# Patient Record
Sex: MALE | Race: WHITE
[De-identification: names, ages, dates, MRNs, and addresses within clinical notes are randomized per-mention and may not be internally consistent; named-entity substitution may affect disease eponyms.]

---

## 2018-08-18 ENCOUNTER — HOSPITAL ENCOUNTER (EMERGENCY)
Dept: HOSPITAL 75 - ER | Age: 68
Discharge: HOME | End: 2018-08-18
Payer: MEDICARE

## 2018-08-18 VITALS — HEIGHT: 69 IN | WEIGHT: 260 LBS | BODY MASS INDEX: 38.51 KG/M2

## 2018-08-18 VITALS — SYSTOLIC BLOOD PRESSURE: 123 MMHG | DIASTOLIC BLOOD PRESSURE: 74 MMHG

## 2018-08-18 DIAGNOSIS — Z87.442: ICD-10-CM

## 2018-08-18 DIAGNOSIS — Z86.73: ICD-10-CM

## 2018-08-18 DIAGNOSIS — Z87.891: ICD-10-CM

## 2018-08-18 DIAGNOSIS — M47.816: Primary | ICD-10-CM

## 2018-08-18 LAB
APTT PPP: YELLOW S
BACTERIA #/AREA URNS HPF: (no result) /HPF
BILIRUB UR QL STRIP: NEGATIVE
FIBRINOGEN PPP-MCNC: CLEAR MG/DL
GLUCOSE UR STRIP-MCNC: NEGATIVE MG/DL
KETONES UR QL STRIP: NEGATIVE
LEUKOCYTE ESTERASE UR QL STRIP: NEGATIVE
NITRITE UR QL STRIP: NEGATIVE
PH UR STRIP: 5 [PH] (ref 5–9)
PROT UR QL STRIP: NEGATIVE
RBC #/AREA URNS HPF: (no result) /HPF
SP GR UR STRIP: 1.02 (ref 1.02–1.02)
UROBILINOGEN UR-MCNC: NORMAL MG/DL
WBC #/AREA URNS HPF: (no result) /HPF

## 2018-08-18 PROCEDURE — 72100 X-RAY EXAM L-S SPINE 2/3 VWS: CPT

## 2018-08-18 PROCEDURE — 81000 URINALYSIS NONAUTO W/SCOPE: CPT

## 2018-08-18 NOTE — DIAGNOSTIC IMAGING REPORT
INDICATION: Back pain, spasms, more on the right side.



TECHNIQUE: AP, lateral and spot imaging of the lumbar spine.



CORRELATION STUDY: None.



FINDINGS: Lumbar spinal alignment is relatively anatomic. Lumbar

vertebral body heights are maintained. Very mild anterior wedging

at the L1-T12 levels, nonacute. Mild multilevel disc space

narrowing but most severe at L5-S1 level. Rather prominent

anterior osteophytes are noted with some areas of bridging

osteophytes present. Hypertrophic facet arthropathy at the L5-S1

levels. Findings suspect for some degree of osseous narrowing of

the foramina and spinal canal at this level. SI joints are

unremarkable. Mild degenerative changes of bilateral hip joints.



IMPRESSION: No radiographic evidence for acute bony abnormality

of the lumbar spine. Chronic appearing changes about the lumbar

spine, including bridging osteophytes and hypertrophic facet

arthropathy. Component of foraminal and/or spinal canal

narrowing, particularly at the L5-S1 level owing to degenerative

changes not excluded.



Dictated by: 



  Dictated on workstation # [id number]

## 2018-08-18 NOTE — ED BACK PAIN
General


Chief Complaint:  Back Problems


Stated Complaint:  BACK SPASMS/PAIN,"KIDNEY AREA"





History of Present Illness


Date Seen by Provider:  Aug 18, 2018


Time Seen by Provider:  20:00


Initial Comments


67-year-old  male presents for right-sided low back and flank pain. He 

reports a history of kidney stones, many years ago but it did require surgical 

removal. He had a stroke in the past with left-sided weakness. He is unsure of 

his medication and does not have a list with him. [facility] He's been taking ibuprofen for the pain as needed.  He reports the pain 

to be noted when he does a twisting in his low back. He denies any radicular 

symptoms.


Timing/Duration:  Intermittent


Pain/Injury Location:  Back


Method of Injury:  Unknown


Associated Symptoms:  muscle spasms (chronic left-sided secondary to CVA.); No 

numbness in legs/feet, No tingling in legs/feet, No sensory/motor loss; lower 

back pain; No loss of bladder control, No loss of bowel control





Allergies and Home Medications


Allergies


Coded Allergies:  


     No Known Drug Allergies (Unverified , 18)





Home Medications


Tramadol HCl 50 Mg Tablet, 50 MG PO Q8H


   Prescribed by: ACE PALUMBO on 18





Patient Home Medication List


Home Medication List Reviewed:  Yes (patient unsure of his medications)





Constitutional:  no symptoms reported, see HPI


Musculoskeletal:  see HPI, back pain





Past Medical-Social-Family Hx


Patient Social History


Alcohol Use:  Denies Use


Recreational Drug Use:  No


Smoking Status:  Former Smoker


Former Smoker, Quit:  Sep 1, 2016


Recent Foreign Travel:  No


Contact w/Someone Who Travel:  No


Recent Hopitalizations:  No





Immunizations Up To Date


Tetanus Booster (TDap):  Unknown





Seasonal Allergies


Seasonal Allergies:  No





Past Medical History


Surgeries:  Yes (KIDNEY STONE REMOVAL, LEFT KNEE )


Respiratory:  No


Neurological:  Yes (STROKE WITH LEFT SIDE WEAKNESS (2016))


Stroke


Genitourinary:  Yes


Kidney Stones


HEENT:  No


Cancer:  No


Psychosocial:  No


Integumentary:  No


Blood Disorders:  No





Physical Exam


Vital Signs





Vital Signs - First Documented








 18





 19:50


 


Temp 98.2


 


Pulse 74


 


Resp 19


 


B/P (MAP) 123/74 (90)


 


Pulse Ox 94


 


O2 Delivery Room Air





Capillary Refill :


Height, Weight, BMI


Height: '"


Weight: lbs. oz. kg;  BMI


Method:


General Appearance:  No Apparent Distress, WD/WN


Neck:  Full Range of Motion, Normal Inspection, Non Tender


Cardiovascular:  Regular Rate, Rhythm, No Edema, No Murmur, Normal Peripheral 

Pulses


Respiratory:  Chest Non Tender, Lungs Clear, Normal Breath Sounds


Gastrointestinal:  Normal Bowel Sounds, No Pulsatile Mass, Non Tender, Soft


Back:  Normal Inspection, No CVA Tenderness (secondary to pain), Decreased 

Range of Motion, Muscle Spasm, Vertebral Tenderness (lower lumbar)


Extremity:  Normal Capillary Refill, Normal Inspection, No Pedal Edema


Neurologic/Psychiatric:  Alert, Oriented x3, No Motor/Sensory Deficits, Normal 

Mood/Affect


Skin:  Normal Color, Warm/Dry





Progress/Results/Core Measures


Results/Orders


Lab Results





Laboratory Tests








Test


 18


19:50 Range/Units


 


 


Urine Color YELLOW   


 


Urine Clarity CLEAR   


 


Urine pH 5  5-9  


 


Urine Specific Gravity 1.025 H 1.016-1.022  


 


Urine Protein NEGATIVE  NEGATIVE  


 


Urine Glucose (UA) NEGATIVE  NEGATIVE  


 


Urine Ketones NEGATIVE  NEGATIVE  


 


Urine Nitrite NEGATIVE  NEGATIVE  


 


Urine Bilirubin NEGATIVE  NEGATIVE  


 


Urine Urobilinogen NORMAL  NORMAL  MG/DL


 


Urine Leukocyte Esterase NEGATIVE  NEGATIVE  


 


Urine RBC (Auto) NEGATIVE  NEGATIVE  


 


Urine RBC NONE   /HPF


 


Urine WBC 0-2   /HPF


 


Urine Squamous Epithelial


Cells 10-25 H


  /HPF





 


Urine Crystals NONE   /LPF


 


Urine Bacteria TRACE   /HPF


 


Urine Casts NONE   /LPF


 


Urine Mucus MODERATE H  /LPF


 


Urine Culture Indicated NO   








My Orders





Orders - ACE PALUMBO


Ua Culture If Indicated (18 19:40)


Lumbar Spine - 2-3 Views (18 20:06)


Tramadol Tablet (Ultram Tablet) (18 20:49)





Vital Signs/I&O











 18





 19:50 21:03


 


Temp 98.2 98.2


 


Pulse 74 74


 


Resp 19 19


 


B/P (MAP) 123/74 (90) 123/74 (90)


 


Pulse Ox 94 94


 


O2 Delivery Room Air 











Progress


Progress Note :  


   Time:  20:00


Progress Note


Initial evaluation completed, recommended UA to assess for kidney stone.


 no RBCs and UA. We'll obtain x-rays of the lumbar spine.


 significant degenerative changes in the lumbar spine and bilateral hips on 

x-ray. No acute processes noted.


 discharge instructions and return precautions reviewed with the patient. 

All questions answered.





Diagnostic Imaging





   Diagonstic Imaging:  Xray


   Plain Films/CT/US/NM/MRI:  other (L Spine)


Comments


NAME:   JOSUE LEIVA


MED REC#:   [id number]


ACCOUNT#:   [id number]


PT STATUS:   REG ER


:   1950


PHYSICIAN:   ACE PALUMBO


ADMIT DATE:   18/ER


 ***Draft***


Date of Exam:18





LUMBAR SPINE - 2-3 VIEWS








INDICATION: Back pain, spasms, more on the right side.





TECHNIQUE: AP, lateral and spot imaging of the lumbar spine.





CORRELATION STUDY: None.





FINDINGS: Lumbar spinal alignment is relatively anatomic. Lumbar


vertebral body heights are maintained. Very mild anterior wedging


at the L1-T12 levels, nonacute. Mild multilevel disc space


narrowing but most severe at L5-S1 level. Rather prominent


anterior osteophytes are noted with some areas of bridging


osteophytes present. Hypertrophic facet arthropathy at the L5-S1


levels. Findings suspect for some degree of osseous narrowing of


the foramina and spinal canal at this level. SI joints are


unremarkable. Mild degenerative changes of bilateral hip joints.





IMPRESSION: No radiographic evidence for acute bony abnormality


of the lumbar spine. Chronic appearing changes about the lumbar


spine, including bridging osteophytes and hypertrophic facet


arthropathy. Component of foraminal and/or spinal canal


narrowing, particularly at the L5-S1 level owing to degenerative


changes not excluded.





  Dictated on workstation # [id number]








Dict:   18


Trans:   18


[facility] [id number]





Interpreted by:     ADRIANNA EVANS DO


Electronically signed by:


   Reviewed:  Reviewed by Me





Departure


Impression





 Primary Impression:  


 Degenerative joint disease (DJD) of lumbar spine


 Qualified Codes:  M47.816 - Spondylosis without myelopathy or radiculopathy, 

lumbar region


Disposition:  01 HOME, SELF-CARE


Condition:  Improved





Departure-Patient Inst.


Decision time for Depature:  20:50


Patient Instructions:  Low Back Pain  (DC)





Add. Discharge Instructions:  


Ice to low back 20 minutes every 2 hours all awake.


Increase water intake.


Take the tramadol 1 tablet every 8 hours for mild-to-moderate pain.


Continue to take ibuprofen 600 mg every 8 hours.


[facility] if symptoms are not 

improving or worsen.


Return to the emergency department for new acute health care problems.





All discharge instructions reviewed with patient and/or family. Voiced 

understanding.


Scripts


Tramadol HCl (Tramadol HCl) 50 Mg Tablet


50 MG PO Q8H, #30 TAB 0 Refills


   Prov: ACE PALUMBO         18











ACE PALUMBO Aug 18, 2018 20:42

## 2019-09-18 ENCOUNTER — HOSPITAL ENCOUNTER (OUTPATIENT)
Dept: HOSPITAL 75 - RT | Age: 69
End: 2019-09-18
Attending: NURSE PRACTITIONER
Payer: MEDICARE

## 2019-09-18 DIAGNOSIS — Z87.891: ICD-10-CM

## 2019-09-18 DIAGNOSIS — I63.9: Primary | ICD-10-CM

## 2019-09-18 DIAGNOSIS — R06.89: ICD-10-CM

## 2019-09-18 PROCEDURE — 94726 PLETHYSMOGRAPHY LUNG VOLUMES: CPT

## 2019-09-18 PROCEDURE — 94729 DIFFUSING CAPACITY: CPT

## 2019-09-18 PROCEDURE — 94060 EVALUATION OF WHEEZING: CPT

## 2019-09-21 ENCOUNTER — HOSPITAL ENCOUNTER (OUTPATIENT)
Dept: HOSPITAL 75 - SLEEP | Age: 69
LOS: 1 days | Discharge: HOME | End: 2019-09-22
Attending: NURSE PRACTITIONER
Payer: OTHER GOVERNMENT

## 2019-09-21 DIAGNOSIS — I63.9: ICD-10-CM

## 2019-09-21 DIAGNOSIS — G47.33: Primary | ICD-10-CM

## 2019-09-21 PROCEDURE — 95811 POLYSOM 6/>YRS CPAP 4/> PARM: CPT

## 2022-10-03 ENCOUNTER — HOSPITAL ENCOUNTER (OUTPATIENT)
Dept: HOSPITAL 75 - CARD | Age: 72
End: 2022-10-03
Attending: UROLOGY
Payer: COMMERCIAL

## 2022-10-03 DIAGNOSIS — C61: Primary | ICD-10-CM

## 2022-10-03 DIAGNOSIS — K57.30: ICD-10-CM

## 2022-10-03 DIAGNOSIS — N28.1: ICD-10-CM

## 2022-10-03 PROCEDURE — 78306 BONE IMAGING WHOLE BODY: CPT

## 2022-10-03 PROCEDURE — 74176 CT ABD & PELVIS W/O CONTRAST: CPT

## 2022-10-03 NOTE — DIAGNOSTIC IMAGING REPORT
INDICATION: 

Prostate cancer. 



TECHNIQUE:

The patient received a 26.2 mCi intravenous dose of technetium

99M MDP. After 3 hours, whole-body planar imaging was performed.



COMPARISON:  

No priors for direct comparison; however, the exam is correlated

with a CT performed this same date of the abdomen and pelvis.



FINDINGS:

There is an arthritic pattern of uptake involving the right

greater than left knees as well as right greater than left

shoulders. A mild degenerative pattern of uptake in the thoracic

and mid to lower lumbar spine is present. These areas correlate

with bony hypertrophy present at CT. No suspicious

radiopharmaceutical accumulation. The calvarium, ribs, sternum,

and manubrium are unremarkable. The shafts of the long bones are

unremarkable. The bony pelvis is unremarkable. 



IMPRESSION: 

Degenerative distribution of the radiopharmacy is present but no

suspicious scintigraphic finding.



Dictated by: 



  Dictated on workstation # [id number]

## 2022-10-03 NOTE — DIAGNOSTIC IMAGING REPORT
PROCEDURE: CT abdomen and pelvis without contrast.



TECHNIQUE: Multiple contiguous axial images were obtained through

the abdomen and pelvis without the use of intravenous contrast.

Auto Exposure Controls were utilized during the CT exam to meet

ALARA standards for radiation dose reduction. 



INDICATION:  Recently diagnosed prostate carcinoma.



No prior studies are available for comparison.



FINDINGS: Lung bases are clear. The liver and gallbladder are

unremarkable. There is no biliary ductal dilatation. Pancreas and

spleen are unremarkable. No adrenal mass is detected. The right

kidney does contain nonobstructing calculi, largest approximately

5 mm in size. There is a large cyst lower pole right kidney

measuring 9.2 x 9.7 cm. Low-attenuation lesion left kidney

measures 3.2 cm and is consistent with a cyst. Aorta is mildly

calcified but is nonaneurysmal. There is diverticulosis of the

descending and sigmoid colon but no evidence of acute

diverticulitis. Bladder is unremarkable. Prostate is enlarged. No

definite abdominal or pelvic lymphadenopathy is seen. There is no

free fluid identified. No osteoblastic lesions are detected.



IMPRESSION:

1. Renal cysts.

2. Uncomplicated diverticulosis.

3. Prostatomegaly.

4. No evidence of abdominal or pelvic lymphadenopathy or

metastatic disease.



Dictated by: 



  Dictated on workstation # [id number]

## 2022-11-16 ENCOUNTER — HOSPITAL ENCOUNTER (OUTPATIENT)
Dept: HOSPITAL 75 - ONC | Age: 72
LOS: 14 days | Discharge: HOME | End: 2022-11-30
Attending: INTERNAL MEDICINE
Payer: COMMERCIAL

## 2022-11-16 DIAGNOSIS — I10: ICD-10-CM

## 2022-11-16 DIAGNOSIS — E66.9: ICD-10-CM

## 2022-11-16 DIAGNOSIS — C61: Primary | ICD-10-CM

## 2022-11-16 DIAGNOSIS — E78.5: ICD-10-CM

## 2022-11-16 PROCEDURE — 96402 CHEMO HORMON ANTINEOPL SQ/IM: CPT

## 2023-02-22 ENCOUNTER — HOSPITAL ENCOUNTER (OUTPATIENT)
Dept: HOSPITAL 75 - PREOP | Age: 73
LOS: 5 days | Discharge: HOME | End: 2023-02-27
Attending: RADIOLOGY
Payer: COMMERCIAL

## 2023-02-22 VITALS — BODY MASS INDEX: 37.09 KG/M2 | HEIGHT: 69.02 IN | WEIGHT: 250.45 LBS

## 2023-02-22 DIAGNOSIS — Z01.818: Primary | ICD-10-CM

## 2023-02-28 ENCOUNTER — HOSPITAL ENCOUNTER (OUTPATIENT)
Dept: HOSPITAL 75 - ONC | Age: 73
Discharge: HOME | End: 2023-02-28
Attending: INTERNAL MEDICINE
Payer: COMMERCIAL

## 2023-02-28 DIAGNOSIS — E66.9: ICD-10-CM

## 2023-02-28 DIAGNOSIS — C61: Primary | ICD-10-CM

## 2023-02-28 DIAGNOSIS — E78.5: ICD-10-CM

## 2023-02-28 DIAGNOSIS — I10: ICD-10-CM

## 2023-02-28 PROCEDURE — 99205 OFFICE O/P NEW HI 60 MIN: CPT

## 2023-03-01 ENCOUNTER — HOSPITAL ENCOUNTER (OUTPATIENT)
Dept: HOSPITAL 75 - SDC | Age: 73
Discharge: HOME | End: 2023-03-01
Attending: RADIOLOGY
Payer: COMMERCIAL

## 2023-03-01 VITALS — SYSTOLIC BLOOD PRESSURE: 156 MMHG | DIASTOLIC BLOOD PRESSURE: 90 MMHG

## 2023-03-01 VITALS — DIASTOLIC BLOOD PRESSURE: 94 MMHG | SYSTOLIC BLOOD PRESSURE: 174 MMHG

## 2023-03-01 VITALS — DIASTOLIC BLOOD PRESSURE: 69 MMHG | SYSTOLIC BLOOD PRESSURE: 112 MMHG

## 2023-03-01 VITALS — BODY MASS INDEX: 37.09 KG/M2 | HEIGHT: 68.9 IN | WEIGHT: 250.45 LBS

## 2023-03-01 VITALS — DIASTOLIC BLOOD PRESSURE: 86 MMHG | SYSTOLIC BLOOD PRESSURE: 163 MMHG

## 2023-03-01 VITALS — DIASTOLIC BLOOD PRESSURE: 87 MMHG | SYSTOLIC BLOOD PRESSURE: 151 MMHG

## 2023-03-01 VITALS — SYSTOLIC BLOOD PRESSURE: 146 MMHG | DIASTOLIC BLOOD PRESSURE: 83 MMHG

## 2023-03-01 VITALS — SYSTOLIC BLOOD PRESSURE: 148 MMHG | DIASTOLIC BLOOD PRESSURE: 92 MMHG

## 2023-03-01 VITALS — DIASTOLIC BLOOD PRESSURE: 84 MMHG | SYSTOLIC BLOOD PRESSURE: 144 MMHG

## 2023-03-01 VITALS — SYSTOLIC BLOOD PRESSURE: 120 MMHG | DIASTOLIC BLOOD PRESSURE: 75 MMHG

## 2023-03-01 VITALS — SYSTOLIC BLOOD PRESSURE: 158 MMHG | DIASTOLIC BLOOD PRESSURE: 86 MMHG

## 2023-03-01 DIAGNOSIS — R97.20: ICD-10-CM

## 2023-03-01 DIAGNOSIS — Z99.81: ICD-10-CM

## 2023-03-01 DIAGNOSIS — C61: Primary | ICD-10-CM

## 2023-03-01 DIAGNOSIS — G47.33: ICD-10-CM

## 2023-03-01 PROCEDURE — 87081 CULTURE SCREEN ONLY: CPT

## 2023-03-01 NOTE — ANESTHESIA-GENERAL POST-OP
General


Patient Condition


Mental Status/LOC:  Same as Preop


Cardiovascular:  Satisfactory


Nausea/Vomiting:  Absent


Respiratory:  Satisfactory


Pain:  Controlled


Complications:  Absent





Post Op Complications


Complications


None





Follow Up Care/Instructions


Patient Instructions


None needed.





Anesthesia/Patient Condition


Patient Condition


Patient is doing well, no complaints, stable vital signs, no apparent adverse 

anesthesia problems.   


No complications reported per nursing.











RICHELLE SERNA CRNA           Mar 1, 2023 10:56

## 2023-03-01 NOTE — PROGRESS NOTE-POST OPERATIVE
Post-Operative Progess Note


Surgeon (s)/Assistant (s)


Surgeon


DUANE MYERS MD


Assistant:  N/A





Pre-Operative Diagnosis


Prostate cancer cT4, PSA 12, Lingle 9-10 / GG 5





Post-Operative Diagnosis





Same as pre-op





Procedure & Operative Findings


Date of Procedure


3/1/23


Procedure Performed/Findings


(1) Placement of fiducial gold seed markers (2) Injection of biodegradable 

hydrogel prostate-rectal spacer utilizing the SpaceOAR system


Anesthesia Type


General





Estimated Blood Loss


Estimated blood loss (mL):  None





Specimens/Packing


Specimens Removed


None


Packing:  


None











MYERS,DUANE E MD                Mar 1, 2023 09:07

## 2023-03-01 NOTE — PROGRESS NOTE-PRE OPERATIVE
Pre-Operative Progress Note


Date of Available H&P:  Feb 20, 2023


Date H&P Reviewed:  Mar 1, 2023


Time H&P Reviewed:  07:04


History & Physical:  H&P Reviewed, No changes noted


Pre-Operative Diagnosis:  Prostate cancer cT4, PSA 12, Ashia 9-10 / GG 5











MYERS,DUANE E MD                Mar 1, 2023 07:05

## 2023-03-01 NOTE — DISCHARGE INST-SIMPLE/STANDARD
Discharge Inst-Standard


Reconcile Patient Problems


Problems Reviewed?:  Yes





Discharge Medications


New, Converted or Re-Newed RX:  Other (Patient has antibiotics at home.)





Patient Instructions/Follow Up


Plan of Care/Instructions/FU:  


1) Treatment planning CT scan at [facility] scheduled for 3/15/23


at 10:00 a.m.


Please drink 1/2 bottle of oral contrast at 9:30 a.m.


Activity as Tolerated:  Yes


Discharge Diet:  No Restrictions











MYERS,DUANE E MD                Mar 1, 2023 07:07

## 2023-03-28 LAB
ALBUMIN SERPL-MCNC: 4.2 GM/DL (ref 3.2–4.5)
ALP SERPL-CCNC: 177 U/L (ref 40–136)
ALT SERPL-CCNC: 44 U/L (ref 0–55)
BASOPHILS # BLD AUTO: 0 10^3/UL (ref 0–0.1)
BASOPHILS NFR BLD AUTO: 1 % (ref 0–10)
BILIRUB SERPL-MCNC: 0.6 MG/DL (ref 0.1–1)
BUN/CREAT SERPL: 23
CALCIUM SERPL-MCNC: 9.4 MG/DL (ref 8.5–10.1)
CHLORIDE SERPL-SCNC: 107 MMOL/L (ref 98–107)
CO2 SERPL-SCNC: 20 MMOL/L (ref 21–32)
CREAT SERPL-MCNC: 1.16 MG/DL (ref 0.6–1.3)
EOSINOPHIL # BLD AUTO: 0.1 10^3/UL (ref 0–0.3)
EOSINOPHIL NFR BLD AUTO: 1 % (ref 0–10)
GFR SERPLBLD BASED ON 1.73 SQ M-ARVRAT: 68 ML/MIN
GLUCOSE SERPL-MCNC: 155 MG/DL (ref 70–105)
HCT VFR BLD CALC: 45 % (ref 40–54)
HGB BLD-MCNC: 15 G/DL (ref 13.3–17.7)
LYMPHOCYTES # BLD AUTO: 2.4 10^3/UL (ref 1–4)
LYMPHOCYTES NFR BLD AUTO: 33 % (ref 12–44)
MANUAL DIFFERENTIAL PERFORMED BLD QL: NO
MCH RBC QN AUTO: 30 PG (ref 25–34)
MCHC RBC AUTO-ENTMCNC: 34 G/DL (ref 32–36)
MCV RBC AUTO: 91 FL (ref 80–99)
MONOCYTES # BLD AUTO: 0.4 10^3/UL (ref 0–1)
MONOCYTES NFR BLD AUTO: 5 % (ref 0–12)
NEUTROPHILS # BLD AUTO: 4.3 10^3/UL (ref 1.8–7.8)
NEUTROPHILS NFR BLD AUTO: 59 % (ref 42–75)
PLATELET # BLD: 171 10^3/UL (ref 130–400)
PMV BLD AUTO: 11.8 FL (ref 9–12.2)
POTASSIUM SERPL-SCNC: 4.5 MMOL/L (ref 3.6–5)
PROT SERPL-MCNC: 7.4 GM/DL (ref 6.4–8.2)
SODIUM SERPL-SCNC: 139 MMOL/L (ref 135–145)
WBC # BLD AUTO: 7.2 10^3/UL (ref 4.3–11)

## 2023-03-31 ENCOUNTER — HOSPITAL ENCOUNTER (OUTPATIENT)
Dept: HOSPITAL 75 - ONC | Age: 73
Discharge: HOME | End: 2023-03-31
Attending: INTERNAL MEDICINE
Payer: COMMERCIAL

## 2023-03-31 DIAGNOSIS — I10: ICD-10-CM

## 2023-03-31 DIAGNOSIS — E66.9: ICD-10-CM

## 2023-03-31 DIAGNOSIS — E78.5: ICD-10-CM

## 2023-03-31 DIAGNOSIS — C61: ICD-10-CM

## 2023-03-31 DIAGNOSIS — Z51.0: Primary | ICD-10-CM

## 2023-03-31 PROCEDURE — 77338 DESIGN MLC DEVICE FOR IMRT: CPT

## 2023-03-31 PROCEDURE — 77385: CPT

## 2023-03-31 PROCEDURE — 77334 RADIATION TREATMENT AID(S): CPT

## 2023-03-31 PROCEDURE — 85025 COMPLETE CBC W/AUTO DIFF WBC: CPT

## 2023-03-31 PROCEDURE — 77301 RADIOTHERAPY DOSE PLAN IMRT: CPT

## 2023-03-31 PROCEDURE — 36415 COLL VENOUS BLD VENIPUNCTURE: CPT

## 2023-03-31 PROCEDURE — 77300 RADIATION THERAPY DOSE PLAN: CPT

## 2023-03-31 PROCEDURE — 77336 RADIATION PHYSICS CONSULT: CPT

## 2023-03-31 PROCEDURE — 80053 COMPREHEN METABOLIC PANEL: CPT

## 2023-04-04 LAB
ALBUMIN SERPL-MCNC: 4.3 GM/DL (ref 3.2–4.5)
ALP SERPL-CCNC: 149 U/L (ref 40–136)
ALT SERPL-CCNC: 37 U/L (ref 0–55)
BILIRUB SERPL-MCNC: 0.7 MG/DL (ref 0.1–1)
BUN/CREAT SERPL: 27
CALCIUM SERPL-MCNC: 9.8 MG/DL (ref 8.5–10.1)
CHLORIDE SERPL-SCNC: 107 MMOL/L (ref 98–107)
CO2 SERPL-SCNC: 21 MMOL/L (ref 21–32)
CREAT SERPL-MCNC: 1.17 MG/DL (ref 0.6–1.3)
GFR SERPLBLD BASED ON 1.73 SQ M-ARVRAT: 67 ML/MIN
GLUCOSE SERPL-MCNC: 118 MG/DL (ref 70–105)
POTASSIUM SERPL-SCNC: 4.1 MMOL/L (ref 3.6–5)
PROT SERPL-MCNC: 7.7 GM/DL (ref 6.4–8.2)
SODIUM SERPL-SCNC: 139 MMOL/L (ref 135–145)

## 2023-04-11 LAB
ALBUMIN SERPL-MCNC: 4.2 GM/DL (ref 3.2–4.5)
ALP SERPL-CCNC: 143 U/L (ref 40–136)
ALT SERPL-CCNC: 27 U/L (ref 0–55)
BILIRUB SERPL-MCNC: 0.7 MG/DL (ref 0.1–1)
BUN/CREAT SERPL: 25
CALCIUM SERPL-MCNC: 9.6 MG/DL (ref 8.5–10.1)
CHLORIDE SERPL-SCNC: 107 MMOL/L (ref 98–107)
CO2 SERPL-SCNC: 22 MMOL/L (ref 21–32)
CREAT SERPL-MCNC: 1.27 MG/DL (ref 0.6–1.3)
GFR SERPLBLD BASED ON 1.73 SQ M-ARVRAT: 61 ML/MIN
GLUCOSE SERPL-MCNC: 130 MG/DL (ref 70–105)
POTASSIUM SERPL-SCNC: 3.7 MMOL/L (ref 3.6–5)
PROT SERPL-MCNC: 7.5 GM/DL (ref 6.4–8.2)
SODIUM SERPL-SCNC: 139 MMOL/L (ref 135–145)

## 2023-04-18 LAB
ALBUMIN SERPL-MCNC: 4.2 GM/DL (ref 3.2–4.5)
ALP SERPL-CCNC: 140 U/L (ref 40–136)
ALT SERPL-CCNC: 21 U/L (ref 0–55)
BASOPHILS # BLD AUTO: 0 10^3/UL (ref 0–0.1)
BASOPHILS NFR BLD AUTO: 1 % (ref 0–10)
BILIRUB SERPL-MCNC: 0.8 MG/DL (ref 0.1–1)
BUN/CREAT SERPL: 23
CALCIUM SERPL-MCNC: 9.4 MG/DL (ref 8.5–10.1)
CHLORIDE SERPL-SCNC: 106 MMOL/L (ref 98–107)
CO2 SERPL-SCNC: 19 MMOL/L (ref 21–32)
CREAT SERPL-MCNC: 1.11 MG/DL (ref 0.6–1.3)
EOSINOPHIL # BLD AUTO: 0.1 10^3/UL (ref 0–0.3)
EOSINOPHIL NFR BLD AUTO: 1 % (ref 0–10)
GFR SERPLBLD BASED ON 1.73 SQ M-ARVRAT: 71 ML/MIN
GLUCOSE SERPL-MCNC: 111 MG/DL (ref 70–105)
HCT VFR BLD CALC: 43 % (ref 40–54)
HGB BLD-MCNC: 14.6 G/DL (ref 13.3–17.7)
LYMPHOCYTES # BLD AUTO: 1.2 10^3/UL (ref 1–4)
LYMPHOCYTES NFR BLD AUTO: 20 % (ref 12–44)
MANUAL DIFFERENTIAL PERFORMED BLD QL: NO
MCH RBC QN AUTO: 31 PG (ref 25–34)
MCHC RBC AUTO-ENTMCNC: 34 G/DL (ref 32–36)
MCV RBC AUTO: 90 FL (ref 80–99)
MONOCYTES # BLD AUTO: 0.5 10^3/UL (ref 0–1)
MONOCYTES NFR BLD AUTO: 8 % (ref 0–12)
NEUTROPHILS # BLD AUTO: 4.2 10^3/UL (ref 1.8–7.8)
NEUTROPHILS NFR BLD AUTO: 70 % (ref 42–75)
PLATELET # BLD: 145 10^3/UL (ref 130–400)
PMV BLD AUTO: 11.4 FL (ref 9–12.2)
POTASSIUM SERPL-SCNC: 3.9 MMOL/L (ref 3.6–5)
PROT SERPL-MCNC: 7.5 GM/DL (ref 6.4–8.2)
SODIUM SERPL-SCNC: 141 MMOL/L (ref 135–145)
WBC # BLD AUTO: 5.9 10^3/UL (ref 4.3–11)

## 2023-04-28 ENCOUNTER — HOSPITAL ENCOUNTER (OUTPATIENT)
Dept: HOSPITAL 75 - ONC | Age: 73
LOS: 2 days | Discharge: HOME | End: 2023-04-30
Attending: INTERNAL MEDICINE
Payer: COMMERCIAL

## 2023-04-28 DIAGNOSIS — C61: ICD-10-CM

## 2023-04-28 DIAGNOSIS — E78.5: ICD-10-CM

## 2023-04-28 DIAGNOSIS — Z51.0: Primary | ICD-10-CM

## 2023-04-28 DIAGNOSIS — E66.9: ICD-10-CM

## 2023-04-28 DIAGNOSIS — I10: ICD-10-CM

## 2023-04-28 PROCEDURE — 84153 ASSAY OF PSA TOTAL: CPT

## 2023-04-28 PROCEDURE — 85025 COMPLETE CBC W/AUTO DIFF WBC: CPT

## 2023-04-28 PROCEDURE — 77385: CPT

## 2023-04-28 PROCEDURE — 36415 COLL VENOUS BLD VENIPUNCTURE: CPT

## 2023-04-28 PROCEDURE — 77300 RADIATION THERAPY DOSE PLAN: CPT

## 2023-04-28 PROCEDURE — 77336 RADIATION PHYSICS CONSULT: CPT

## 2023-04-28 PROCEDURE — 80053 COMPREHEN METABOLIC PANEL: CPT

## 2023-05-25 ENCOUNTER — HOSPITAL ENCOUNTER (OUTPATIENT)
Dept: HOSPITAL 75 - ONC | Age: 73
LOS: 6 days | Discharge: HOME | End: 2023-05-31
Attending: INTERNAL MEDICINE
Payer: COMMERCIAL

## 2023-05-25 DIAGNOSIS — I10: ICD-10-CM

## 2023-05-25 DIAGNOSIS — Z51.0: Primary | ICD-10-CM

## 2023-05-25 DIAGNOSIS — C61: ICD-10-CM

## 2023-05-25 DIAGNOSIS — E66.9: ICD-10-CM

## 2023-05-25 DIAGNOSIS — E78.5: ICD-10-CM

## 2023-05-25 PROCEDURE — 96402 CHEMO HORMON ANTINEOPL SQ/IM: CPT

## 2023-05-25 PROCEDURE — 77336 RADIATION PHYSICS CONSULT: CPT

## 2023-05-25 PROCEDURE — 77385: CPT

## 2023-07-25 ENCOUNTER — HOSPITAL ENCOUNTER (OUTPATIENT)
Dept: HOSPITAL 75 - ONC | Age: 73
LOS: 6 days | Discharge: HOME | End: 2023-07-31
Attending: INTERNAL MEDICINE
Payer: COMMERCIAL

## 2023-07-25 DIAGNOSIS — E66.9: ICD-10-CM

## 2023-07-25 DIAGNOSIS — I10: ICD-10-CM

## 2023-07-25 DIAGNOSIS — C61: Primary | ICD-10-CM

## 2023-07-25 DIAGNOSIS — E78.5: ICD-10-CM

## 2023-07-25 LAB
ALBUMIN SERPL-MCNC: 4.1 GM/DL (ref 3.2–4.5)
ALP SERPL-CCNC: 129 U/L (ref 40–136)
ALT SERPL-CCNC: 19 U/L (ref 0–55)
BASOPHILS # BLD AUTO: 0.1 10^3/UL (ref 0–0.1)
BASOPHILS NFR BLD AUTO: 1 % (ref 0–10)
BILIRUB SERPL-MCNC: 0.8 MG/DL (ref 0.1–1)
BUN/CREAT SERPL: 20
CALCIUM SERPL-MCNC: 9.6 MG/DL (ref 8.5–10.1)
CHLORIDE SERPL-SCNC: 107 MMOL/L (ref 98–107)
CO2 SERPL-SCNC: 21 MMOL/L (ref 21–32)
CREAT SERPL-MCNC: 1.33 MG/DL (ref 0.6–1.3)
EOSINOPHIL # BLD AUTO: 0.1 10^3/UL (ref 0–0.3)
EOSINOPHIL NFR BLD AUTO: 1 % (ref 0–10)
GFR SERPLBLD BASED ON 1.73 SQ M-ARVRAT: 58 ML/MIN
GLUCOSE SERPL-MCNC: 156 MG/DL (ref 70–105)
HCT VFR BLD CALC: 37 % (ref 40–54)
HGB BLD-MCNC: 12.3 G/DL (ref 13.3–17.7)
LYMPHOCYTES # BLD AUTO: 1 10^3/UL (ref 1–4)
LYMPHOCYTES NFR BLD AUTO: 17 % (ref 12–44)
MANUAL DIFFERENTIAL PERFORMED BLD QL: NO
MCH RBC QN AUTO: 32 PG (ref 25–34)
MCHC RBC AUTO-ENTMCNC: 34 G/DL (ref 32–36)
MCV RBC AUTO: 96 FL (ref 80–99)
MONOCYTES # BLD AUTO: 0.5 10^3/UL (ref 0–1)
MONOCYTES NFR BLD AUTO: 8 % (ref 0–12)
NEUTROPHILS # BLD AUTO: 4.1 10^3/UL (ref 1.8–7.8)
NEUTROPHILS NFR BLD AUTO: 73 % (ref 42–75)
PLATELET # BLD: 157 10^3/UL (ref 130–400)
PMV BLD AUTO: 11.2 FL (ref 9–12.2)
POTASSIUM SERPL-SCNC: 3.2 MMOL/L (ref 3.6–5)
PROT SERPL-MCNC: 7.3 GM/DL (ref 6.4–8.2)
SODIUM SERPL-SCNC: 143 MMOL/L (ref 135–145)
WBC # BLD AUTO: 5.7 10^3/UL (ref 4.3–11)

## 2023-07-25 PROCEDURE — 85025 COMPLETE CBC W/AUTO DIFF WBC: CPT

## 2023-07-25 PROCEDURE — 80053 COMPREHEN METABOLIC PANEL: CPT

## 2023-07-25 PROCEDURE — 84153 ASSAY OF PSA TOTAL: CPT

## 2023-07-25 PROCEDURE — 99213 OFFICE O/P EST LOW 20 MIN: CPT

## 2023-08-08 ENCOUNTER — HOSPITAL ENCOUNTER (OUTPATIENT)
Dept: HOSPITAL 75 - ONC | Age: 73
LOS: 23 days | Discharge: HOME | End: 2023-08-31
Attending: INTERNAL MEDICINE
Payer: COMMERCIAL

## 2023-08-08 DIAGNOSIS — Z51.11: Primary | ICD-10-CM

## 2023-08-08 DIAGNOSIS — E78.5: ICD-10-CM

## 2023-08-08 DIAGNOSIS — E66.9: ICD-10-CM

## 2023-08-08 DIAGNOSIS — C61: ICD-10-CM

## 2023-08-08 DIAGNOSIS — I10: ICD-10-CM

## 2023-08-08 PROCEDURE — 96402 CHEMO HORMON ANTINEOPL SQ/IM: CPT

## 2023-10-17 LAB
ALBUMIN SERPL-MCNC: 4.1 GM/DL (ref 3.2–4.5)
ALP SERPL-CCNC: 143 U/L (ref 40–136)
ALT SERPL-CCNC: 20 U/L (ref 0–55)
BASOPHILS # BLD AUTO: 0 10^3/UL (ref 0–0.1)
BASOPHILS NFR BLD AUTO: 1 % (ref 0–10)
BILIRUB SERPL-MCNC: 0.9 MG/DL (ref 0.1–1)
BUN/CREAT SERPL: 21
CALCIUM SERPL-MCNC: 9.4 MG/DL (ref 8.5–10.1)
CHLORIDE SERPL-SCNC: 107 MMOL/L (ref 98–107)
CO2 SERPL-SCNC: 22 MMOL/L (ref 21–32)
CREAT SERPL-MCNC: 1.32 MG/DL (ref 0.6–1.3)
EOSINOPHIL # BLD AUTO: 0.1 10^3/UL (ref 0–0.3)
EOSINOPHIL NFR BLD AUTO: 1 % (ref 0–10)
GFR SERPLBLD BASED ON 1.73 SQ M-ARVRAT: 58 ML/MIN
GLUCOSE SERPL-MCNC: 170 MG/DL (ref 70–105)
HCT VFR BLD CALC: 44 % (ref 40–54)
HGB BLD-MCNC: 14.2 G/DL (ref 13.3–17.7)
LYMPHOCYTES # BLD AUTO: 1.2 10^3/UL (ref 1–4)
LYMPHOCYTES NFR BLD AUTO: 20 % (ref 12–44)
MANUAL DIFFERENTIAL PERFORMED BLD QL: NO
MCH RBC QN AUTO: 32 PG (ref 25–34)
MCHC RBC AUTO-ENTMCNC: 33 G/DL (ref 32–36)
MCV RBC AUTO: 97 FL (ref 80–99)
MONOCYTES # BLD AUTO: 0.4 10^3/UL (ref 0–1)
MONOCYTES NFR BLD AUTO: 7 % (ref 0–12)
NEUTROPHILS # BLD AUTO: 4.2 10^3/UL (ref 1.8–7.8)
NEUTROPHILS NFR BLD AUTO: 71 % (ref 42–75)
PLATELET # BLD: 158 10^3/UL (ref 130–400)
PMV BLD AUTO: 11.6 FL (ref 9–12.2)
POTASSIUM SERPL-SCNC: 3.4 MMOL/L (ref 3.6–5)
PROT SERPL-MCNC: 7.3 GM/DL (ref 6.4–8.2)
SODIUM SERPL-SCNC: 141 MMOL/L (ref 135–145)
WBC # BLD AUTO: 5.9 10^3/UL (ref 4.3–11)

## 2023-10-18 ENCOUNTER — HOSPITAL ENCOUNTER (OUTPATIENT)
Dept: HOSPITAL 75 - RAD | Age: 73
End: 2023-10-18
Attending: HOSPITALIST
Payer: COMMERCIAL

## 2023-10-18 DIAGNOSIS — M25.512: Primary | ICD-10-CM

## 2023-10-18 PROCEDURE — 73030 X-RAY EXAM OF SHOULDER: CPT

## 2023-10-18 PROCEDURE — 73060 X-RAY EXAM OF HUMERUS: CPT

## 2023-10-18 NOTE — DIAGNOSTIC IMAGING REPORT
INDICATION:  Fall one month ago, unable to move left arm.



TECHNIQUE:  AP and lateral views of the left humerus  



CORRELATION STUDY:  None



FINDINGS: 

There is no acute fracture. Visualized shoulder and elbow appear

generally unremarkable.  There is somewhat heterogeneous

appearance about the right humerus.  Perhaps early bony

demineralization. No findings to suggest underlying reparative

changes.



IMPRESSION: 

1.  Negative for acute bony abnormality of the humerus.



Dictated by: 



  Dictated on workstation # [id number]

## 2023-10-18 NOTE — DIAGNOSTIC IMAGING REPORT
INDICATION:  Fall about a month ago, stroke, unable to move left

arm.



TECHNIQUE: Three  views of the  left shoulder  



CORRELATION STUDY:  None



FINDINGS: 

The left humeral head is slightly inferior to relation to the

glenoid. There is cortical irregularity that appears to originate

off the likely posterior aspect of the glenoid. The humeral head

appears to be intact. The acromioclavicular joint maintained. 

The visualized soft tissues are unremarkable.



IMPRESSION: 

1.  Slight inferior subluxation of the humeral head in relation

to the glenoid. No georgette dislocation.

2. Findings do suggest probable fracture of the posterior bony

glenoid. Age indeterminate. If further assessment desired, CT

imaging would be recommended.



Dictated by: 



  Dictated on workstation # [id number]

## 2023-10-31 ENCOUNTER — HOSPITAL ENCOUNTER (OUTPATIENT)
Dept: HOSPITAL 75 - ONC | Age: 73
Discharge: HOME | End: 2023-10-31
Attending: INTERNAL MEDICINE
Payer: COMMERCIAL

## 2023-10-31 DIAGNOSIS — E78.5: ICD-10-CM

## 2023-10-31 DIAGNOSIS — C61: Primary | ICD-10-CM

## 2023-10-31 DIAGNOSIS — E66.9: ICD-10-CM

## 2023-10-31 DIAGNOSIS — I10: ICD-10-CM

## 2023-10-31 PROCEDURE — 85025 COMPLETE CBC W/AUTO DIFF WBC: CPT

## 2023-10-31 PROCEDURE — 84153 ASSAY OF PSA TOTAL: CPT

## 2023-10-31 PROCEDURE — 96402 CHEMO HORMON ANTINEOPL SQ/IM: CPT

## 2023-10-31 PROCEDURE — 80053 COMPREHEN METABOLIC PANEL: CPT

## 2023-10-31 PROCEDURE — 99214 OFFICE O/P EST MOD 30 MIN: CPT

## 2023-10-31 PROCEDURE — 36415 COLL VENOUS BLD VENIPUNCTURE: CPT
